# Patient Record
Sex: FEMALE | Race: BLACK OR AFRICAN AMERICAN
[De-identification: names, ages, dates, MRNs, and addresses within clinical notes are randomized per-mention and may not be internally consistent; named-entity substitution may affect disease eponyms.]

---

## 2017-07-16 ENCOUNTER — HOSPITAL ENCOUNTER (EMERGENCY)
Dept: HOSPITAL 72 - EMR | Age: 47
Discharge: HOME | End: 2017-07-16
Payer: MEDICAID

## 2017-07-16 VITALS — WEIGHT: 175 LBS | BODY MASS INDEX: 27.47 KG/M2 | HEIGHT: 67 IN

## 2017-07-16 VITALS — DIASTOLIC BLOOD PRESSURE: 72 MMHG | SYSTOLIC BLOOD PRESSURE: 106 MMHG

## 2017-07-16 VITALS — SYSTOLIC BLOOD PRESSURE: 108 MMHG | DIASTOLIC BLOOD PRESSURE: 73 MMHG

## 2017-07-16 VITALS — SYSTOLIC BLOOD PRESSURE: 122 MMHG | DIASTOLIC BLOOD PRESSURE: 82 MMHG

## 2017-07-16 DIAGNOSIS — F43.10: ICD-10-CM

## 2017-07-16 DIAGNOSIS — F41.9: ICD-10-CM

## 2017-07-16 DIAGNOSIS — F20.9: ICD-10-CM

## 2017-07-16 DIAGNOSIS — Z88.0: ICD-10-CM

## 2017-07-16 DIAGNOSIS — F31.9: ICD-10-CM

## 2017-07-16 DIAGNOSIS — F17.200: ICD-10-CM

## 2017-07-16 DIAGNOSIS — R11.2: Primary | ICD-10-CM

## 2017-07-16 DIAGNOSIS — F15.10: ICD-10-CM

## 2017-07-16 DIAGNOSIS — R21: ICD-10-CM

## 2017-07-16 LAB
ALBUMIN/GLOB SERPL: 1.3 {RATIO} (ref 1–2.7)
ALT SERPL-CCNC: 8 U/L (ref 3–33)
ANION GAP SERPL CALC-SCNC: 16 MMOL/L (ref 5–15)
APPEARANCE UR: CLEAR
AST SERPL-CCNC: 16 U/L (ref 5–40)
BASOPHILS NFR BLD AUTO: 0.4 % (ref 0–2)
CALCIUM SERPL-MCNC: 9 MG/DL (ref 8.6–10.2)
CHLORIDE SERPL-SCNC: 97 MEQ/L (ref 98–107)
CO2 SERPL-SCNC: 24 MEQ/L (ref 20–30)
CREAT SERPL-MCNC: 0.6 MG/DL (ref 0.5–0.9)
EOSINOPHIL NFR BLD AUTO: 1.7 % (ref 0–3)
ERYTHROCYTE [DISTWIDTH] IN BLOOD BY AUTOMATED COUNT: 12.2 % (ref 11.6–14.8)
GFR SERPLBLD BASED ON 1.73 SQ M-ARVRAT: > 60 ML/MIN (ref 60–?)
GLOBULIN SER-MCNC: 2.8 G/DL
HEMOLYSIS: 11
KETONES UR QL STRIP: NEGATIVE
LEUKOCYTE ESTERASE UR QL STRIP: NEGATIVE
LIPASE SERPL-CCNC: 22 U/L (ref ?–60)
LYMPHOCYTES NFR BLD AUTO: 16.1 % (ref 20–45)
MCH RBC QN AUTO: 29.3 PG (ref 27–31)
MCHC RBC AUTO-ENTMCNC: 31.9 G/DL (ref 32–36)
MCV RBC AUTO: 92 FL (ref 80–99)
MONOCYTES NFR BLD AUTO: 6.8 % (ref 1–10)
NEUTROPHILS NFR BLD AUTO: 74.9 % (ref 45–75)
NITRITE UR QL STRIP: NEGATIVE
PH UR STRIP: 6 [PH] (ref 4.5–8)
PLATELET # BLD: 183 K/UL (ref 150–450)
PMV BLD AUTO: 7.8 FL (ref 6.5–10.1)
POTASSIUM SERPL-SCNC: 3.8 MEQ/L (ref 3.4–4.9)
PROT SERPL-MCNC: 6.7 G/DL (ref 6.6–8.7)
PROT UR QL STRIP: NEGATIVE
RBC # BLD AUTO: 4.46 M/UL (ref 4.2–5.4)
SODIUM SERPL-SCNC: 137 MEQ/L (ref 135–145)
SP GR UR STRIP: 1.01 (ref 1–1.03)
TROPONIN I SERPL-MCNC: < 0.3 NG/ML (ref ?–0.3)
UROBILINOGEN UR-MCNC: NORMAL MG/DL (ref 0–1)
WBC # BLD AUTO: 8.9 K/UL (ref 4.8–10.8)

## 2017-07-16 PROCEDURE — 99284 EMERGENCY DEPT VISIT MOD MDM: CPT

## 2017-07-16 PROCEDURE — 71010: CPT

## 2017-07-16 PROCEDURE — 84484 ASSAY OF TROPONIN QUANT: CPT

## 2017-07-16 PROCEDURE — 81003 URINALYSIS AUTO W/O SCOPE: CPT

## 2017-07-16 PROCEDURE — 93005 ELECTROCARDIOGRAM TRACING: CPT

## 2017-07-16 PROCEDURE — 81025 URINE PREGNANCY TEST: CPT

## 2017-07-16 PROCEDURE — 96375 TX/PRO/DX INJ NEW DRUG ADDON: CPT

## 2017-07-16 PROCEDURE — 83690 ASSAY OF LIPASE: CPT

## 2017-07-16 PROCEDURE — 29530 STRAPPING OF KNEE: CPT

## 2017-07-16 PROCEDURE — 85025 COMPLETE CBC W/AUTO DIFF WBC: CPT

## 2017-07-16 PROCEDURE — 80053 COMPREHEN METABOLIC PANEL: CPT

## 2017-07-16 PROCEDURE — 96374 THER/PROPH/DIAG INJ IV PUSH: CPT

## 2017-07-16 PROCEDURE — 82550 ASSAY OF CK (CPK): CPT

## 2017-07-16 PROCEDURE — 36415 COLL VENOUS BLD VENIPUNCTURE: CPT

## 2017-07-16 PROCEDURE — 80164 ASSAY DIPROPYLACETIC ACD TOT: CPT

## 2017-07-16 PROCEDURE — 80300: CPT

## 2017-07-16 NOTE — EMERGENCY ROOM REPORT
History of Present Illness


General


Chief Complaint:  Pain


Source:  Patient





Present Illness


HPI


Patient was brought in by EMS from the rehabilitation facility.  She states 

that she has an infection in her lip from bug bites that  s causing her to 

drink toxins.  This is causing her to have chest pain.  It's also causing her 

to vomit.  She is unable to keep down her medications.  She apparently is on 

Depakote.  She has a history of seizures and also bipolar disorder.  She 

recently moved into the rehab facility.  Before she was walking a lot and has 

some L knee pain.  She's also had some loose stools.  She also has a rash on 

the R side of her face.





No fevers, cough, dysuria, headache.   





She denies drugs to me (but is in rehab - also see results).  + smoker.  Benny 

SI or HI.  No recent seizure.


Allergies:  


Uncoded Allergies:  


     PENICILLIN (Allergy, Unknown, 17)





Patient History


Past Medical History:  see triage record


Social History:  Reports: drug use, smoking


Social History Narrative


in rehab facility


Last Menstrual Period:  17


Pregnant Now:  No


:  9


Para:  8


Reviewed Nursing Documentation:  PMH: Agreed, PSxH: Agreed





Nursing Documentation-PMH


History Of Psychiatric Problem:  Yes - Bipolar, PTSD, Schizophrenia, Anxiety





Review of Systems


All Other Systems:  negative except mentioned in HPI





Physical Exam





Vital Signs








  Date Time  Temp Pulse Resp B/P Pulse Ox O2 Delivery O2 Flow Rate FiO2


 


17 04:18 98.2 96 18 106/72 99 Room Air  








Sp02 EP Interpretation:  reviewed, normal


General Appearance:  no apparent distress, alert, GCS 15


Head:  normocephalic


Eyes:  bilateral eye PERRL, bilateral eye Scleral Injection, bilateral eye 

other - periorbital edema


ENT:  moist mucus membranes, other - bite lower lip, no drainage, no pharyngeal 

inflammation


Neck:  supple


Respiratory:  chest non-tender, lungs clear, normal breath sounds


Cardiovascular #1:  regular rate, rhythm


Cardiovascular #2:  2+ radial (R)


Gastrointestinal:  normal inspection, normal bowel sounds, no mass, non-

distended, tenderness - minimal epigastric


Musculoskeletal:  back normal, gait/station normal, normal range of motion, 

tender - L knee, no effusion, ligaments stable


Neurologic:  alert, oriented x3, motor strength/tone normal, DTRs symmetric, 

sensory intact, cerebellar normal, normal gait, speech normal - but pressured


Psychiatric:  anxious


Skin:  warm/dry, other - picked lesions on R cheek, see lip





Medical Decision Making


Diagnostic Impression:  


 Primary Impression:  


 Vomiting


 Qualified Codes:  R11.2 - Nausea with vomiting, unspecified


 Additional Impressions:  


 Rash


 Amphetamine abuse


 Alleged bipolar disorder


ER Course


Patient presents with vomiting and complaints of chest pain.  DDx: GERD, 

gastritis, viral syndrome, anxiety, Melody Molina tear, mediastinitis amongst 

others.  Emergent evaluation with EKG, CXR, labs.  Treatment with IV hydration, 

zofran, pepcid.  Cardiac monitor.  There is no evidence that the lip "bite" is 

draining.  Knee - no fx or internal problem by exam and [place] knee rules.





EKG and CXR normal.  Labs significant for + amphetamines.  Valproate low.  

Given dose here.





Patient tolerating PO well here.





When confronted with + tox, she denied recent use.  When I called her on this, 

her story started to change.  She still insisted that there were "toxins" in 

her body.  I agreed and stated it was the crystal.  She disagreed.  She stated 

she just wanted to leave.





Ace place by tech.  Position and tension excellent with normal neurovasc as 

checked by me.





She was discharged back to the rehab facility where she came from.





Patient stable for outpatient observation and treatment.





Laboratory Tests








Test


  17


04:49 17


04:53


 


White Blood Count


  8.9 K/UL


(4.8-10.8) 


 


 


Red Blood Count


  4.46 M/UL


(4.20-5.40) 


 


 


Hemoglobin


  13.1 G/DL


(12.0-16.0) 


 


 


Hematocrit


  41.1 %


(37.0-47.0) 


 


 


Mean Corpuscular Volume 92 FL (80-99)   


 


Mean Corpuscular Hemoglobin


  29.3 PG


(27.0-31.0) 


 


 


Mean Corpuscular Hemoglobin


Concent 31.9 G/DL


(32.0-36.0)  L 


 


 


Red Cell Distribution Width


  12.2 %


(11.6-14.8) 


 


 


Platelet Count


  183 K/UL


(150-450) 


 


 


Mean Platelet Volume


  7.8 FL


(6.5-10.1) 


 


 


Neutrophils (%) (Auto)


  74.9 %


(45.0-75.0) 


 


 


Lymphocytes (%) (Auto)


  16.1 %


(20.0-45.0)  L 


 


 


Monocytes (%) (Auto)


  6.8 %


(1.0-10.0) 


 


 


Eosinophils (%) (Auto)


  1.7 %


(0.0-3.0) 


 


 


Basophils (%) (Auto)


  0.4 %


(0.0-2.0) 


 


 


Urine Color Pale yellow   


 


Urine Appearance Clear   


 


Urine pH 6 (4.5-8.0)   


 


Urine Specific Gravity


  1.010


(1.005-1.035) 


 


 


Urine Protein


  Negative


(NEGATIVE) 


 


 


Urine Glucose (UA)


  Negative


(NEGATIVE) 


 


 


Urine Ketones


  Negative


(NEGATIVE) 


 


 


Urine Occult Blood


  Negative


(NEGATIVE) 


 


 


Urine Nitrite


  Negative


(NEGATIVE) 


 


 


Urine Bilirubin


  Negative


(NEGATIVE) 


 


 


Urine Urobilinogen


  Normal MG/DL


(0.0-1.0) 


 


 


Urine Leukocyte Esterase


  Negative


(NEGATIVE) 


 


 


Urine HCG, Qualitative Negative   


 


Sodium Level


  137 mEQ/L


(135-145) 


 


 


Potassium Level


  3.8 mEQ/L


(3.4-4.9) 


 


 


Chloride Level


  97 mEQ/L


()  L 


 


 


Carbon Dioxide Level


  24 mEQ/L


(20-30) 


 


 


Anion Gap 16 (5-15)  H 


 


Blood Urea Nitrogen


  7 mg/dL (7-23)


  


 


 


Creatinine


  0.6 mg/dL


(0.5-0.9) 


 


 


Estimate Glomerular


Filtration Rate > 60 mL/min


(>60) 


 


 


Glucose Level


  130 mg/dL


()  H 


 


 


Calcium Level


  9.0 mg/dL


(8.6-10.2) 


 


 


Total Bilirubin


  0.6 mg/dL


(0.0-1.2) 


 


 


Aspartate Amino Transferase


(AST) 16 U/L (5-40)  


  


 


 


Alanine Aminotransferase (ALT) 8 U/L (3-33)   


 


Alkaline Phosphatase


  70 U/L


() 


 


 


Total Creatine Kinase


  239 U/L


()  H 


 


 


Troponin I


  < 0.30 ng/mL


(<=0.30) 


 


 


Total Protein


  6.7 g/dL


(6.6-8.7) 


 


 


Albumin


  3.9 g/dL


(3.5-5.2) 


 


 


Globulin 2.8 g/dL   


 


Albumin/Globulin Ratio 1.3 (1.0-2.7)   


 


Lipase 22 U/L (< 60)   


 


Valproic Acid Level


  4 ug/mL


()  L 


 


 


Urine Opiates Screen


  


  Positive


(NEGATIVE)  H


 


Urine Barbiturates Screen


  


  Negative


(NEGATIVE)


 


Phencyclidine (PCP) Screen


  


  Negative


(NEGATIVE)


 


Urine Amphetamines Screen


  


  Positive


(NEGATIVE)  H


 


Urine Benzodiazepines Screen


  


  Negative


(NEGATIVE)


 


Urine Cocaine Screen


  


  Negative


(NEGATIVE)


 


Urine Marijuana (THC) Screen


  


  Positive


(NEGATIVE)  H








EKG Diagnostic Results


Rate:  normal


Rhythm:  NSR


ST Segments:  no acute changes





Rhythm Strip Diag. Results


EP Interpretation:  yes


Rhythm:  NSR, no PVC's, no ectopy





Other X-Ray Diagnostic Results


Other X-Ray Diagnostic Results :  


   # of Views/Limited Vs Complete:  1 View


   Indication:  Pain


   EP Interpretation:  Yes


   Interpretation:  no dislocation, no soft tissue swelling, no fractures


   Impression:  No acute disease


   Interpreting ER Provider:  Electronic signature Robin Hermosillo MD





Last Vital Signs








  Date Time  Temp Pulse Resp B/P Pulse Ox O2 Delivery O2 Flow Rate FiO2


 


17 07:45 97.8 83 16 108/73 98 Room Air  





  81      








Status:  improved


Disposition:  HOME, SELF-CARE


Condition:  Improved


Scripts


Famotidine (PEPCID) 20 Mg Tablet


20 MG ORAL DAILY, #30 TAB 0 Refills


   Prov: Robin Hermosillo M.D.         17 


Ondansetron Odt* (ZOFRAN ODT*) 4 Mg Tab.rapdis


4 MG ORAL Q6H Y for Nausea & Vomiting, #6 TAB 0 Refills


   Prov: Robin Hermosillo M.D.         17 


Bacitracin (Bacitracin) 28.4 Gm Oint...g.


1 APPLIC TOPIC BID, #10 GM


   Prov: Robin Hermosillo M.D.         17 


Acetaminophen (Tylenol) 325 Mg Tablet


650 MG ORAL Q6H Y for Prn Pain/Headache/Temp > 101, #30 TAB 0 Refills


   Prov: Robin Hermosillo M.D.         17


Referrals:  


[facility],REFERRING (PCP)











Robin Hermosillo M.D. 2017 05:10

## 2017-07-16 NOTE — DIAGNOSTIC IMAGING REPORT
Indication: Chest pain



Technique: XRAY CHEST 1 V



Comparison: None



Findings: Cardiomediastinal silhouette is within normal limits. There is no

consolidation or pleural effusion. Osseous structures demonstrate no acute

abnormality.



Impression:



No acute cardiopulmonary disease.

## 2018-06-08 ENCOUNTER — HOSPITAL ENCOUNTER (EMERGENCY)
Dept: HOSPITAL 72 - EMR | Age: 48
Discharge: HOME | End: 2018-06-08
Payer: MEDICAID

## 2018-06-08 VITALS — SYSTOLIC BLOOD PRESSURE: 115 MMHG | DIASTOLIC BLOOD PRESSURE: 66 MMHG

## 2018-06-08 VITALS — DIASTOLIC BLOOD PRESSURE: 66 MMHG | SYSTOLIC BLOOD PRESSURE: 115 MMHG

## 2018-06-08 VITALS — HEIGHT: 67 IN | BODY MASS INDEX: 29.82 KG/M2 | WEIGHT: 190 LBS

## 2018-06-08 DIAGNOSIS — J45.909: ICD-10-CM

## 2018-06-08 DIAGNOSIS — F43.10: ICD-10-CM

## 2018-06-08 DIAGNOSIS — G40.909: ICD-10-CM

## 2018-06-08 DIAGNOSIS — Z88.0: ICD-10-CM

## 2018-06-08 DIAGNOSIS — F41.9: ICD-10-CM

## 2018-06-08 DIAGNOSIS — R51: Primary | ICD-10-CM

## 2018-06-08 DIAGNOSIS — Z76.0: ICD-10-CM

## 2018-06-08 DIAGNOSIS — F31.9: ICD-10-CM

## 2018-06-08 DIAGNOSIS — Z88.6: ICD-10-CM

## 2018-06-08 PROCEDURE — 99284 EMERGENCY DEPT VISIT MOD MDM: CPT

## 2018-06-08 NOTE — EMERGENCY ROOM REPORT
History of Present Illness


General


Chief Complaint:  Headache


Source:  Patient





Present Illness


HPI


48-year-old female patient presents ER for medication refill.  Ports that she 

has been off her Seroquel and Depakote for 3 days since being discharged from a 

psych facility.  Reports that she takes medication for seizures and bipolar 

disorder.  Reports she has not had any seizures during this time.  Denies 

thoughts of hurting herself or anyone O's.  Reports pain and migraine symptoms 

arise when she has been off her medication.  Reports history of drug use, 

states that she wants to become clean.requesting information about shelters.  

Denies fever, chest pain, shortness of breath, vision changes, abdominal pain.


Allergies:  


Coded Allergies:  


     CODEINE (Verified  Allergy, Unknown, 6/8/18)


Uncoded Allergies:  


     PENICILLIN (Allergy, Unknown, 7/16/17)





Patient History


Past Medical History:  see triage record


Reviewed Nursing Documentation:  PMH: Agreed; PSxH: Agreed





Nursing Documentation-PMH


Past Medical History:  No History, Except For


Hx Asthma:  Yes


History Of Psychiatric Problem:  Yes - bipolar, ptsd, anxiety





Review of Systems


All Other Systems:  negative except mentioned in HPI





Physical Exam





Vital Signs








  Date Time  Temp Pulse Resp B/P (MAP) Pulse Ox O2 Delivery O2 Flow Rate FiO2


 


6/8/18 11:28 97.9 92 18 115/66 98 Room Air  





 97.9       








Sp02 EP Interpretation:  reviewed, normal


General Appearance:  well appearing, no apparent distress, alert, GCS 15, non-

toxic


Head:  normocephalic, atraumatic


Eyes:  bilateral eye normal inspection, bilateral eye PERRL


ENT:  hearing grossly normal, normal pharynx, no angioedema, normal voice, 

uvula midline, moist mucus membranes


Neck:  full range of motion


Respiratory:  lungs clear, normal breath sounds, no rhonchi, no respiratory 

distress, no accessory muscle use, no wheezing, speaking full sentences


Cardiovascular #1:  regular rate, rhythm, no edema


Gastrointestinal:  non tender, soft, no mass, non-distended, no guarding, no 

rebound


Genitourinary:  no CVA tenderness


Musculoskeletal:  back normal, digits/nails normal, gait/station normal, normal 

range of motion, non-tender


Neurologic:  alert, oriented x3, responsive, motor strength/tone normal, 

sensory intact


Psychiatric:  mood/affect normal





Medical Decision Making


PA Attestation


Dr. Ibrahim  is my supervising Physician whom patient management has been 

discussed with.


Diagnostic Impression:  


 Primary Impression:  


 Medication refill


 Additional Impressions:  


 Hx of seizure disorder


 Hx of bipolar disorder


ER Course


Pt. presents to the ED requesting prescription refill.





Multiple differentials were considered.





Vital signs: are WNL, pt. is afebrile





ORDERS: 


PE benign, lungs clear to auscultation, no abdominal tenderness to palpation, 

full ROM of extremities, ambulating without a limp, 


Patient denies recent history of seizures, does not believe patient needs labs, 

workup, imaging at this time.


Informed patient ER does not normally provide refills or prescriptions.


Contact primary care provider for further treatment.


Informed patient ER cannot provide refills in the future; followup, management 

and prescription of long-term medications must be performed by primary care 

provider or mental health professional.


Do not believe the patient is a danger to herself or others at this time.


No focal neuro deficits, alert and oriented x4, patient walking and talking 

without difficulty.


Patient OK for discharge to home.


Drink plenty of fluids.


Provided with information for shelters and psychiatric urgent care.


Don't use drugs.





Patient also complaining of constipation x2 days, reports no abdominal pain, no 

abdominal TTP.


Drink plenty of fluids, will provide Colace medication to patient.





DISCHARGE: 


Rx provided for Seroquel


Rx provided for Depakote


Rx provided for Benadryl   


Rx provided for Colace





At this time pt is stable for d/c to home.  Patient is resting comfortably, in 

no acute distress, nontoxic appearing, talking without difficulty.


Patient to take medications as instructed


Will provide with patient care instructions and any necessary prescriptions.


Care plan and follow-up instructions provided.  


Patient instructed to follow-up with primary care provider in 3 - 5 days.


Patient questions asked and answered.


Patient reports understanding and agreement to treatment plan.


ER precautions given. Patient instructed to return to ER immediately for any 

new or worsening of symptoms including but not limited to increasing SOB, 

persistent fever.





- Please note that this Emergency Department Report was dictated using Played technology software, occasionally this can lead to 

erroneous entry secondary to interpretation by the dictation equipment.





Last Vital Signs








  Date Time  Temp Pulse Resp B/P (MAP) Pulse Ox O2 Delivery O2 Flow Rate FiO2


 


6/8/18 11:37 97.9 78 18 115/66 98 Room Air  





 97.9       








Disposition:  HOME, SELF-CARE


Condition:  Stable


Scripts


Docusate Sodium* (COLACE*) 100 Mg Capsule


100 MG ORAL TWICE A DAY, #6 CAP


   Prov: Chin Reid         6/8/18 


Diphenhydramine Hcl* (BENADRYL*) 25 Mg Capsule


25 MG ORAL DAILY PRN for Itching, #20 CAP


   Prov: Chin Reid         6/8/18 


Divalproex Sodium* (DEPAKOTE*) 250 Mg Tablet.dr


250 MG PO Q12HR, #40 TAB


   Prov: Chin Reid         6/8/18 


Quetiapine Fumarate* (SEROQUEL*) 25 Mg Tablet


25 MG ORAL DAILY, #20 TAB


   Prov: Chin Reid         6/8/18


Patient Instructions:  Bipolar Disorder, Constipation, Adult, Easy-to-Read, 

Medicine Refill at the Emergency Department, Seizure, Adult





Additional Instructions:  


Followup with primary care provider  mental health professional.


Contact mental health urgent care and shelters to arrange living.


Take medications as directed. 


Drink plenty of fluids, take Tylenol for pain symptoms.


Patient questions asked and answered.


ER precautions given, patient instructed to return to ER immediately for any 

new or worsening of symptoms.











Chin Reid Jun 8, 2018 12:25